# Patient Record
Sex: MALE | Race: WHITE | NOT HISPANIC OR LATINO | Employment: FULL TIME | URBAN - METROPOLITAN AREA
[De-identification: names, ages, dates, MRNs, and addresses within clinical notes are randomized per-mention and may not be internally consistent; named-entity substitution may affect disease eponyms.]

---

## 2022-07-25 ENCOUNTER — OFFICE VISIT (OUTPATIENT)
Dept: URGENT CARE | Facility: URGENT CARE | Age: 32
End: 2022-07-25
Payer: COMMERCIAL

## 2022-07-25 VITALS
OXYGEN SATURATION: 100 % | SYSTOLIC BLOOD PRESSURE: 122 MMHG | DIASTOLIC BLOOD PRESSURE: 91 MMHG | HEART RATE: 77 BPM | TEMPERATURE: 98 F

## 2022-07-25 DIAGNOSIS — S61.412A LACERATION OF LEFT HAND WITHOUT FOREIGN BODY, INITIAL ENCOUNTER: Primary | ICD-10-CM

## 2022-07-25 PROCEDURE — 12001 RPR S/N/AX/GEN/TRNK 2.5CM/<: CPT | Performed by: FAMILY MEDICINE

## 2022-07-25 PROCEDURE — 90471 IMMUNIZATION ADMIN: CPT | Performed by: FAMILY MEDICINE

## 2022-07-25 PROCEDURE — 90715 TDAP VACCINE 7 YRS/> IM: CPT | Performed by: FAMILY MEDICINE

## 2022-07-25 NOTE — PROGRESS NOTES
SUBJECTIVE: @[email or web address] who presents to the clinic with a laceration on the hand sustained last evening ago.    This is a non-work related and accidental injury.    Mechanism of injury: blunt trauma (contusion).  Associated symptoms: Denies numbness, weakness, or loss of function  Last tetanus booster within 10 years: almost 10 years ago    No past medical history on file.  No Known Allergies  Social History     Tobacco Use     Smoking status: Not on file     Smokeless tobacco: Not on file   Substance Use Topics     Alcohol use: Not on file       ROS:  Neuro: good distal sensation  Motor: normal rom and strenght  Hem: capillary refill < 2 sec    EXAM: The patient appears today in alert,no apparent distress distressVITALS:   BP (!) 122/91 (BP Location: Right arm, Patient Position: Sitting, Cuff Size: Adult Regular)   Pulse 77   Temp 98  F (36.7  C)   SpO2 100%   Size of laceration: 1 centimeters  Characteristics of the laceration: clean and straight  Tendon function intact: yes  Sensation to light touch intact: yes  Pulses/Capillary refill intact: yes      ICD-10-CM    1. Laceration of left hand without foreign body, initial encounter  S61.412A TDAP VACCINE (Adacel, Boostrix)  [[id number]]     REPAIR SUPERFICIAL, WOUND BODY < =2.5CM     Procedure Note:Wound injected with 1 cc's of Lidocaine 1% plain  Good anesthesia was obtainedPrepped and draped in the usual sterile fashion  Wound cleaned with sterile water  Wound soakedLaceration was closed using 2 5-0 nylon interrupted sutures  After care instructions:Keep wound clean   Sutures out in 10 days  Signs of infection discussed today

## 2022-07-25 NOTE — PROGRESS NOTES
Prior to immunization administration, verified patients identity using patient s name and date of birth. Please see Immunization Activity for additional information.     Screening Questionnaire for Adult Immunization    Are you sick today?   No   Do you have allergies to medications, food, a vaccine component or latex?   NO   Have you ever had a serious reaction after receiving a vaccination?   No   Do you have a long-term health problem with heart, lung, kidney, or metabolic disease (e.g., diabetes), asthma, a blood disorder, no spleen, complement component deficiency, a cochlear implant, or a spinal fluid leak?  Are you on long-term aspirin therapy?   No   Do you have cancer, leukemia, HIV/AIDS, or any other immune system problem?   No   Do you have a parent, brother, or sister with an immune system problem?   No   In the past 3 months, have you taken medications that affect  your immune system, such as prednisone, other steroids, or anticancer drugs; drugs for the treatment of rheumatoid arthritis, Crohn s disease, or psoriasis; or have you had radiation treatments?   No   Have you had a seizure, or a brain or other nervous system problem?   No   During the past year, have you received a transfusion of blood or blood    products, or been given immune (gamma) globulin or antiviral drug?   No   For women: Are you pregnant or is there a chance you could become       pregnant during the next month?   No   Have you received any vaccinations in the past 4 weeks?   No     Immunization questionnaire answers were all negative.        Per orders of Dr. Cleaning, injection of Tdap given by Amy Strange MA. Patient instructed to remain in clinic for 15 minutes afterwards, and to report any adverse reaction to me immediately.       Screening performed by Amy Strange MA on 7/25/2022 at 11:57 AM.

## 2025-07-31 ENCOUNTER — OFFICE VISIT (OUTPATIENT)
Dept: URGENT CARE | Facility: URGENT CARE | Age: 35
End: 2025-07-31
Payer: COMMERCIAL

## 2025-07-31 VITALS
HEART RATE: 72 BPM | TEMPERATURE: 97.2 F | WEIGHT: 170.6 LBS | SYSTOLIC BLOOD PRESSURE: 149 MMHG | DIASTOLIC BLOOD PRESSURE: 107 MMHG | HEIGHT: 70 IN | BODY MASS INDEX: 24.42 KG/M2 | OXYGEN SATURATION: 99 % | RESPIRATION RATE: 19 BRPM

## 2025-07-31 DIAGNOSIS — L02.419 CELLULITIS AND ABSCESS OF LEG: Primary | ICD-10-CM

## 2025-07-31 DIAGNOSIS — T63.451A HORNET STING, ACCIDENTAL OR UNINTENTIONAL, INITIAL ENCOUNTER: ICD-10-CM

## 2025-07-31 DIAGNOSIS — L03.119 CELLULITIS AND ABSCESS OF LEG: Primary | ICD-10-CM

## 2025-07-31 RX ORDER — VORTIOXETINE 10 MG/1
1 TABLET, FILM COATED ORAL DAILY
COMMUNITY
Start: 2025-06-26

## 2025-07-31 RX ORDER — SULFAMETHOXAZOLE AND TRIMETHOPRIM 800; 160 MG/1; MG/1
1 TABLET ORAL 2 TIMES DAILY
Qty: 14 TABLET | Refills: 0 | Status: SHIPPED | OUTPATIENT
Start: 2025-07-31 | End: 2025-08-07

## 2025-07-31 RX ORDER — PREDNISONE 20 MG/1
40 TABLET ORAL DAILY
Qty: 10 TABLET | Refills: 0 | Status: SHIPPED | OUTPATIENT
Start: 2025-07-31 | End: 2025-08-05

## 2025-07-31 NOTE — PROGRESS NOTES
Assessment & Plan     Diagnosis:    ICD-10-CM    1. Cellulitis and abscess of leg  L03.119 sulfamethoxazole-trimethoprim (BACTRIM DS) 800-160 MG tablet    L02.419     right leg      2. Hornet sting, accidental or unintentional, initial encounter  T63.451A predniSONE (DELTASONE) 20 MG tablet          Medical Decision Making:  Adebayo Jones is a 35 year old male who presents for evaluation of skin redness.  The history, physical exam and supporting data are consistent with cellulitis near a recent hornet sting site; there was a small abscess that appears to have spontaneously healed.   There do not appear at this time to be any complication of cellulitis including necrotizing fascitis, lymphangitis, lymphadenitis, abscess, osteomyelitis, sepsis, or shock.  The patient is not immunosuppressed.  Some of the swelling is likely secondary to allergic phenomenon as well, will start him on antibiotics and an oral steroid.  Can continue antihistamines as well.  Supportive outpatient management is indicated with antibiotics.  Close follow-up of primary care physician to ensure no progression and rapid resolution.   Cellulitis precautions for home.  Patient verbalizes understanding and agreement with the plan including reasons to go to the ER. All questions answered.     RADHA Maldonado [facility]    Subjective     Adebayo Jones is a 35 year old male who presents to clinic today for the following health issues:  Chief Complaint   Patient presents with    Insect Bites     Wasp sting on both legs a week ago. Swelling and redness.        HPI  Patient notes that he was stung on his legs about a week ago from hornets, the left leg seems to be doing fine; but the right one appears to be getting infected.  His foot is getting quite swollen and red, the initial sting site appears to develop a small abscess that drained some yellow/white pus.  This seems to be getting better but he is concerned about infection.   "No red streaks going up the leg, fevers, chills, history of diabetes or other immunocompromised state.  He also denies any tongue or lip swelling, difficulty swallowing or breathing or other concerns.    Review of Systems    See HPI    Objective      Vitals: BP (!) 149/107 (BP Location: Left arm, Patient Position: Sitting, Cuff Size: Adult Large)   Pulse 72   Temp 97.2  F (36.2  C) (Oral)   Resp 19   Ht 1.778 m (5' 10\")   Wt 77.4 kg (170 lb 9.6 oz)   SpO2 99%   BMI 24.48 kg/m        Patient Vitals for the past 24 hrs:   BP Temp Temp src Pulse Resp SpO2 Height Weight   07/31/25 1041 (!) 149/107 97.2  F (36.2  C) Oral 72 19 99 % 1.778 m (5' 10\") 77.4 kg (170 lb 9.6 oz)       Vital signs reviewed by: Richard Wilder PA-C    Physical Exam   Constitutional: Patient is alert and cooperative. No acute distress.  Mouth: Mucous membranes are moist. Normal tongue and tonsil. Posterior oropharynx is clear.  Cardiovascular: Regular rate and rhythm  Pulmonary/Chest: Lungs are clear to auscultation throughout. Effort normal. No respiratory distress. No wheezes, rales or rhonchi.  MSK/Skin: Right leg with a small deflated fluctuant region over the lateral malleolus with surrounding erythema, warmth and swelling.  Swelling extends into the dorsum of the foot, slightly warm to palpation.  Normal range of motion at the ankle.  No lymphangitis.  No other areas of pointing/fluctuance.  Psychiatric:The patient has a normal mood and affect.       Richard Wilder PA-C, July 31, 2025        "

## 2025-07-31 NOTE — LETTER
July 31, 2025      Adebayo Jones  [id number] [id number]        To Whom It May Concern:    Adebayo Jones  was seen on 7/31/2025.  Please excuse him 7/31 - 8/2/2025 due to illness.        Sincerely,        Richard Wilder PA-C    Electronically signed

## 2025-07-31 NOTE — PROGRESS NOTES
Urgent Care Clinic Visit    Chief Complaint   Patient presents with    Insect Bites     Wasp sting on both legs a week ago. Swelling and redness.               7/31/2025    10:42 AM   Additional Questions   Roomed by Giuliano Torres MA   Accompanied by Self